# Patient Record
Sex: MALE | Race: WHITE | NOT HISPANIC OR LATINO | Employment: FULL TIME | URBAN - METROPOLITAN AREA
[De-identification: names, ages, dates, MRNs, and addresses within clinical notes are randomized per-mention and may not be internally consistent; named-entity substitution may affect disease eponyms.]

---

## 2018-12-03 ENCOUNTER — OFFICE VISIT (OUTPATIENT)
Dept: URGENT CARE | Facility: MEDICAL CENTER | Age: 53
End: 2018-12-03
Payer: COMMERCIAL

## 2018-12-03 VITALS
WEIGHT: 196.8 LBS | DIASTOLIC BLOOD PRESSURE: 72 MMHG | HEIGHT: 71 IN | RESPIRATION RATE: 16 BRPM | BODY MASS INDEX: 27.55 KG/M2 | SYSTOLIC BLOOD PRESSURE: 156 MMHG | OXYGEN SATURATION: 98 % | HEART RATE: 96 BPM | TEMPERATURE: 97.9 F

## 2018-12-03 DIAGNOSIS — L05.01 PILONIDAL CYST WITH ABSCESS: ICD-10-CM

## 2018-12-03 PROCEDURE — 99214 OFFICE O/P EST MOD 30 MIN: CPT | Performed by: PHYSICIAN ASSISTANT

## 2018-12-03 RX ORDER — METRONIDAZOLE 500 MG/1
500 TABLET ORAL 3 TIMES DAILY
Qty: 21 TAB | Refills: 0 | Status: SHIPPED | OUTPATIENT
Start: 2018-12-03 | End: 2018-12-10

## 2018-12-03 RX ORDER — CEPHALEXIN 500 MG/1
500 CAPSULE ORAL 2 TIMES DAILY
Qty: 14 CAP | Refills: 0 | Status: SHIPPED | OUTPATIENT
Start: 2018-12-03 | End: 2018-12-10

## 2018-12-03 ASSESSMENT — ENCOUNTER SYMPTOMS
DIARRHEA: 0
NAUSEA: 0
VOMITING: 0
CHILLS: 0
FEVER: 0

## 2018-12-03 NOTE — PROGRESS NOTES
"Subjective:   Duglas Lind is a 53 y.o. male who presents for Cyst (on tailbone, had it for years but ruptured yesturday, oozing)        Patient complains of intermittent pilonidal cyst times 3 years.  He began experiencing pain and pressure yesterday and states that the lesion drained on its own.  His pain is significantly improved.  He states that he has not had them drained in the past, as they have always drained on their own.       Review of Systems   Constitutional: Negative for chills and fever.   Gastrointestinal: Negative for diarrhea, nausea and vomiting.   Genitourinary: Negative.      Review of systems as listed in HPI. All other systems reviewed and are otherwise negative.    PMH:  has no past medical history on file.  MEDS:   Current Outpatient Prescriptions:   •  Pseudoephedrine-Acetaminophen (PSEUDOEPHEDRINE SINUS PO), Take  by mouth., Disp: , Rfl:   •  DiphenhydrAMINE HCl, Sleep, (UNISOM SLEEPMELTS) 25 MG TABLET DISPERSIBLE, Take  by mouth., Disp: , Rfl:   •  metroNIDAZOLE (FLAGYL) 500 MG Tab, Take 1 Tab by mouth 3 times a day for 7 days., Disp: 21 Tab, Rfl: 0  •  cephALEXin (KEFLEX) 500 MG Cap, Take 1 Cap by mouth 2 times a day for 7 days., Disp: 14 Cap, Rfl: 0  •  gabapentin (NEURONTIN) 300 MG CAPS, Take 300 mg by mouth 2 Times a Day., Disp: , Rfl:   •  mirtazapine (REMERON SOLTAB) 15 MG TBDP, Take 15 mg by mouth every evening., Disp: , Rfl:   ALLERGIES: No Known Allergies  SURGHX: No past surgical history on file.  SOCHX:  reports that he has been smoking.  He has been smoking about 0.50 packs per day. He has never used smokeless tobacco. He reports that he does not drink alcohol or use drugs.  FH: Family history was reviewed, no pertinent findings to report   Objective:   /72   Pulse 96   Temp 36.6 °C (97.9 °F)   Resp 16   Ht 1.803 m (5' 11\")   Wt 89.3 kg (196 lb 12.8 oz)   SpO2 98%   BMI 27.45 kg/m²   Physical Exam   Constitutional: He appears well-developed and well-nourished. "   HENT:   Head: Normocephalic and atraumatic.   Nose: Nose normal.   Neck: Neck supple.   Pulmonary/Chest: Effort normal. No respiratory distress.   Neurological: He is alert.   Skin: Skin is warm and dry.   Mild edema, no erythema in gluteal cleft. Purlent drainage from opening appx 2 inches inferior to gluteal cleft.  Non tender to palpation. When I milk down from gluteal cleft towards opening more fluid is expressed.   Psychiatric: He has a normal mood and affect. His behavior is normal. Thought content normal.   Vitals reviewed.        Assessment/Plan:   1. Pilonidal cyst with abscess  - metroNIDAZOLE (FLAGYL) 500 MG Tab; Take 1 Tab by mouth 3 times a day for 7 days.  Dispense: 21 Tab; Refill: 0  - cephALEXin (KEFLEX) 500 MG Cap; Take 1 Cap by mouth 2 times a day for 7 days.  Dispense: 14 Cap; Refill: 0    Other orders  - Pseudoephedrine-Acetaminophen (PSEUDOEPHEDRINE SINUS PO); Take  by mouth.  - DiphenhydrAMINE HCl, Sleep, (UNISOM SLEEPMELTS) 25 MG TABLET DISPERSIBLE; Take  by mouth.    Lesion is actively draining and only a small amount of purulent fluid expressed on a -no need to I&D.  Patient is already doing sitz baths was instructed to continue these.  He should wash area with soap and water.  I will start the patient on antibiotics to help resolve the infection.    Pt instructed to complete full course of medication despite symptomatic improvement. Pt to take med with meals to alleviate GI upset. Pt to take a probiotic or eat Sabrina’s yogurt/ kefir while taking the medication.    Presentation for pilonidal cyst is atypical and I am concerned that the patient may have formed a fistula from which the sinus is draining.  Furthermore patient did not start developing these until age 50, which is also unusual.  I would like him to be evaluated by gen surg in the next 1-3 weeks.  Pt states that he does not require a referral and will make an appt.    Differential diagnosis, natural history, supportive care, and  indications for immediate follow-up discussed.

## 2019-02-14 ENCOUNTER — OFFICE VISIT (OUTPATIENT)
Dept: URGENT CARE | Facility: MEDICAL CENTER | Age: 54
End: 2019-02-14
Payer: COMMERCIAL

## 2019-02-14 VITALS
OXYGEN SATURATION: 95 % | SYSTOLIC BLOOD PRESSURE: 130 MMHG | HEIGHT: 71 IN | HEART RATE: 86 BPM | WEIGHT: 200.6 LBS | TEMPERATURE: 98.5 F | DIASTOLIC BLOOD PRESSURE: 78 MMHG | BODY MASS INDEX: 28.08 KG/M2

## 2019-02-14 DIAGNOSIS — J04.0 LARYNGITIS, ACUTE: ICD-10-CM

## 2019-02-14 DIAGNOSIS — J01.90 ACUTE NON-RECURRENT SINUSITIS, UNSPECIFIED LOCATION: ICD-10-CM

## 2019-02-14 PROCEDURE — 99214 OFFICE O/P EST MOD 30 MIN: CPT | Performed by: FAMILY MEDICINE

## 2019-02-14 RX ORDER — AMOXICILLIN AND CLAVULANATE POTASSIUM 875; 125 MG/1; MG/1
1 TABLET, FILM COATED ORAL 2 TIMES DAILY
Qty: 20 TAB | Refills: 0 | Status: SHIPPED | OUTPATIENT
Start: 2019-02-14 | End: 2019-02-24

## 2019-02-14 ASSESSMENT — ENCOUNTER SYMPTOMS
STRIDOR: 0
HOARSE VOICE: 1
SHORTNESS OF BREATH: 0
SINUS PRESSURE: 1
SORE THROAT: 0
NEUROLOGICAL NEGATIVE: 1
COUGH: 1
SINUS PAIN: 1
EYES NEGATIVE: 1
MUSCULOSKELETAL NEGATIVE: 1
CONSTITUTIONAL NEGATIVE: 1
CARDIOVASCULAR NEGATIVE: 1

## 2019-02-14 NOTE — PATIENT INSTRUCTIONS
Start oral antibiotics  Nasal saline irrigation  Follow up if not significantly improved as expected in 7 ndays, sooner if any worsening or new symptoms    Sinusitis, Adult  Sinusitis is soreness and inflammation of your sinuses. Sinuses are hollow spaces in the bones around your face. They are located:  · Around your eyes.  · In the middle of your forehead.  · Behind your nose.  · In your cheekbones.  Your sinuses and nasal passages are lined with a stringy fluid (mucus). Mucus normally drains out of your sinuses. When your nasal tissues get inflamed or swollen, the mucus can get trapped or blocked so air cannot flow through your sinuses. This lets bacteria, viruses, and funguses grow, and that leads to infection.  Follow these instructions at home:  Medicines  · Take, use, or apply over-the-counter and prescription medicines only as told by your doctor. These may include nasal sprays.  · If you were prescribed an antibiotic medicine, take it as told by your doctor. Do not stop taking the antibiotic even if you start to feel better.  Hydrate and Humidify  · Drink enough water to keep your pee (urine) clear or pale yellow.  · Use a cool mist humidifier to keep the humidity level in your home above 50%.  · Breathe in steam for 10-15 minutes, 3-4 times a day or as told by your doctor. You can do this in the bathroom while a hot shower is running.  · Try not to spend time in cool or dry air.  Rest  · Rest as much as possible.  · Sleep with your head raised (elevated).  · Make sure to get enough sleep each night.  General instructions  · Put a warm, moist washcloth on your face 3-4 times a day or as told by your doctor. This will help with discomfort.  · Wash your hands often with soap and water. If there is no soap and water, use hand .  · Do not smoke. Avoid being around people who are smoking (secondhand smoke).  · Keep all follow-up visits as told by your doctor. This is important.  Contact a doctor  if:  · You have a fever.  · Your symptoms get worse.  · Your symptoms do not get better within 10 days.  Get help right away if:  · You have a very bad headache.  · You cannot stop throwing up (vomiting).  · You have pain or swelling around your face or eyes.  · You have trouble seeing.  · You feel confused.  · Your neck is stiff.  · You have trouble breathing.  This information is not intended to replace advice given to you by your health care provider. Make sure you discuss any questions you have with your health care provider.  Document Released: 06/05/2009 Document Revised: 08/13/2017 Document Reviewed: 10/12/2016  [employer] Patient Education © 2017 [employer]        Laryngitis  Introduction  Laryngitis is swelling (inflammation) of your vocal cords. This causes hoarseness, coughing, loss of voice, sore throat, or a dry throat. When your vocal cords are inflamed, your voice sounds different.  Laryngitis can be temporary (acute) or long-term (chronic). Most cases of acute laryngitis improve with time. Chronic laryngitis is laryngitis that lasts for more than three weeks.  Follow these instructions at home:  · Drink enough fluid to keep your pee (urine) clear or pale yellow.  · Breathe in moist air. Use a humidifier if you live in a dry climate.  · Take medicines only as told by your doctor.  · Do not smoke cigarettes or electronic cigarettes. If you need help quitting, ask your doctor.  · Talk as little as possible. Also avoid whispering, which can cause vocal strain.  · Write instead of talking. Do this until your voice is back to normal.  Contact a doctor if:  · You have a fever.  · Your pain is worse.  · You have trouble swallowing.  Get help right away if:  · You cough up blood.  · You have trouble breathing.  This information is not intended to replace advice given to you by your health care provider. Make sure you discuss any questions you have with your health care provider.  Document  Released: 12/06/2012 Document Revised: 05/25/2017 Document Reviewed: 06/02/2015  © 2017 [employer]

## 2019-02-14 NOTE — PROGRESS NOTES
"Subjective:      Duglas Lind is a 53 y.o. male who presents with Cough (congestion/ sinus pressure/ headaches X1 week)            Sinus Problem   This is a new problem. Episode onset: 7-10 days. The problem has been gradually worsening since onset. There has been no fever. The pain is moderate. Associated symptoms include congestion, coughing, a hoarse voice (started 2-3 days ago) and sinus pressure. Pertinent negatives include no ear pain, shortness of breath or sore throat. Past treatments include saline sprays and oral decongestants (He stopped using decongestants once he started having hoarseness.). The treatment provided mild relief.   No history of sinus surgeries in the past    Review of Systems   Constitutional: Negative.    HENT: Positive for congestion, hoarse voice (started 2-3 days ago), sinus pain and sinus pressure. Negative for ear pain and sore throat.    Eyes: Negative.    Respiratory: Positive for cough. Negative for shortness of breath and stridor.    Cardiovascular: Negative.    Musculoskeletal: Negative.    Skin: Negative.    Neurological: Negative.           Objective:     /78   Pulse 86   Temp 36.9 °C (98.5 °F) (Temporal)   Ht 1.803 m (5' 11\")   Wt 91 kg (200 lb 9.6 oz)   SpO2 95%   BMI 27.98 kg/m²      Physical Exam   Constitutional: He is oriented to person, place, and time. He appears well-developed and well-nourished.  Non-toxic appearance. He does not have a sickly appearance. He does not appear ill. No distress.   HENT:   Head: Normocephalic and atraumatic.   Right Ear: Tympanic membrane, external ear and ear canal normal.   Left Ear: Tympanic membrane, external ear and ear canal normal.   Nose: Mucosal edema present. Right sinus exhibits maxillary sinus tenderness. Right sinus exhibits no frontal sinus tenderness. Left sinus exhibits maxillary sinus tenderness. Left sinus exhibits no frontal sinus tenderness.   Mouth/Throat: Uvula is midline and oropharynx is clear and " moist. No oral lesions. No trismus in the jaw. No uvula swelling. No oropharyngeal exudate, posterior oropharyngeal edema, posterior oropharyngeal erythema or tonsillar abscesses. No tonsillar exudate.   Eyes: Conjunctivae are normal.   Neck: Neck supple.   Cardiovascular: Normal rate and regular rhythm.  Exam reveals no gallop and no friction rub.    No murmur heard.  Pulmonary/Chest: Effort normal. No stridor. No respiratory distress. He has no wheezes. He has no rales.   Lymphadenopathy:     He has no cervical adenopathy.   Neurological: He is alert and oriented to person, place, and time.   Skin: Skin is warm. No rash noted. He is not diaphoretic. No erythema. No pallor.   Psychiatric: He has a normal mood and affect.               Assessment/Plan:     1. Acute non-recurrent sinusitis, unspecified location  - amoxicillin-clavulanate (AUGMENTIN) 875-125 MG Tab; Take 1 Tab by mouth 2 times a day for 10 days.  Dispense: 20 Tab; Refill: 0    2. Laryngitis, acute    Continue symptomatic care  Continue nasal saline irrigation  Plan per orders and instructions  Warning signs reviewed

## 2021-03-15 DIAGNOSIS — Z23 NEED FOR VACCINATION: ICD-10-CM

## 2025-08-25 ENCOUNTER — TELEPHONE (OUTPATIENT)
Dept: HEALTH INFORMATION MANAGEMENT | Facility: OTHER | Age: 60
End: 2025-08-25
Payer: COMMERCIAL